# Patient Record
Sex: FEMALE | Race: BLACK OR AFRICAN AMERICAN
[De-identification: names, ages, dates, MRNs, and addresses within clinical notes are randomized per-mention and may not be internally consistent; named-entity substitution may affect disease eponyms.]

---

## 2020-07-24 ENCOUNTER — HOSPITAL ENCOUNTER (INPATIENT)
Dept: HOSPITAL 74 - JLDR | Age: 35
LOS: 2 days | Discharge: HOME | End: 2020-07-26
Attending: OBSTETRICS & GYNECOLOGY | Admitting: OBSTETRICS & GYNECOLOGY
Payer: COMMERCIAL

## 2020-07-24 VITALS — RESPIRATION RATE: 15 BRPM | OXYGEN SATURATION: 97 %

## 2020-07-24 VITALS — BODY MASS INDEX: 34.4 KG/M2

## 2020-07-24 DIAGNOSIS — Z91.013: ICD-10-CM

## 2020-07-24 DIAGNOSIS — Z3A.39: ICD-10-CM

## 2020-07-24 DIAGNOSIS — D25.1: ICD-10-CM

## 2020-07-24 DIAGNOSIS — O34.211: ICD-10-CM

## 2020-07-24 DIAGNOSIS — Z91.018: ICD-10-CM

## 2020-07-24 RX ADMIN — Medication SCH MLS: at 15:37

## 2020-07-24 NOTE — OP
Operative Note





- Note:


Operative Date: 07/24/20


Pre-Operative Diagnosis: repeat lt c  s


Operation: repeat lt c s


Post-Operative Diagnosis: Same as Pre-op


Surgeon: Cristofer Briseno


Assistant: Edson Smith


Anesthesiologist/CRNA: Gibson Garcia


Anesthesia: Spinal


Estimated Blood Loss (mls): 600 (no complications )


Operative Report Dictated: Yes

## 2020-07-24 NOTE — HP
- family desires PEG tube placement - GI consulted for placement    1. Continue Isosource 1.5 @ goal rate 50mL/hr.   - Provides 1800kcals, 82g protein and 917mL free water.      2. If more concentrated formula warranted (EF 45%), recommend Nutren 2.0 @ goal rate 40mL/hr.   - Provides 1920kcals, 81g protein and 664mL free water.   - Hold for residuals >500mL.      RD to monitor.       Past Medical History





- Admission


Chief Complaint: repeat lt c s , fibroid uterus


History Source: Patient


Limitations to Obtaining History: No Limitations





- Past Medical History


CNS: No: Alzheimer's, CVA, Dementia, Migraine, Multiple Sclerosis, Peripheral 

Neuropathy, Parkinson's, Seizure, Syncope, TIA, Vertigo, Other


Cardiovascular: No: AFIB, Aneurysm, Aortic Insufficiency, Aortic Stenosis, CAD, 

CHF, Deep Vein Thrombosis, HTN, Hyperlipdemia, MI, Mitral Insufficiency, Mitral 

Stenosis, Murmur, Pulmonary Hypertension, Other


Pulmonary: No: Asthma, Bronchitis, Cancer, COPD, O2 Dependent, Pneumonia, 

Previously Intubated, Pulmonary Embolus, Pulmonary Fibrosis, Sleep Apnea, Other


Gastrointestinal: No: Ascites, Cancer, Constipation, Crohn's Disease, Diverti

culitis, Diverticulosis, Esophageal Varices, Gastritis, GERD, GI Bleed, 

Hemorrhoids, Hiatal Hernia, Inflamatory Bowel Disease, Irritable Bowel Disease, 

Pancreatitis, Peptic Ulcer Disease, Ulcerative Colitis, Other


Hepatobiliary: No: Cirrhosis, Cholelithiasis, Cholecystitis, 

Choledocholithiasis, Hepatitis A, Hepatitis B, Hepatitis C, Other


Renal/: No: Renal Failure, Renal Inusuff, BPH, Cancer, Hematuria, 

Hemodialysis, Neurogenic Bladder, Renal Calculi, UTI, Other


Reproductive: No: Ectopic Pregnancy, Endometriosis, Fibroids, PID, Polycystic 

Ovary Syndrome, Postmenopausal, Other


...: 2


...Para: 1


...Term: 1


...: 0


...Spon : 0


...Induced : 0


...Living Children: 1


...Multiple Gestation: 0


...LMP: 10/23/19


... Weeks Gestation by Dates: 39.0


...EDC by Dates: 20


Heme/Onc: No: Anemia, B12 Deficiency, Bleeding Disorder, Cancer, Current 

Chemotherapy, Current Radiation Therapy, Hemochromatosis, Hypercoaguable State, 

Myeloproliferative Synd, Sickle Cell Disease, Sickle Cell Trait, Thro

mbocytopenia, Other


Infectious Disease: No: AIDS, C-Diff, Herpes Zoster, HIV, MRSA, STD's, 

Tuberculosis, VREF, Other


Psych: No: Addictions, Anxiety, Bipolar, Depression, Panic, Psychosis, 

Schizophrenia, Other


Musculoskeletal: No: Bursitis, Chronic low back pain, Hemiparesis, Hemiplegia, 

Osteoarthritis, Paraplegia, Other


Rheumatology: No: Fibromyalgia, Gout, Lupus, Rheumatoid Arthritis, Sarcoidosis, 

Vasculitis, Other


ENT: No: Allergic Rhinitis, Sinusitis, Other


Endocrine: No: Nicollet's Disease, Cushing's Disease, Diabetes Insipidus, 

Diabetes Mellitus, Hyperparathyroidism, Hyperthyroidism, Hypothyroidism, 

Osteopenia, SIADH, Other


Dermatology: No: Basal Cell, Cellulitis, Eczema, Melanoma, Psoriasis, Squamous 

Cell, Other





- Past Surgical History


Past Surgical History: Yes: 


Hx Myomectomy: No


Hx Transabdominal Cerclage: No





- Advance Directives


Advance Directives: Yes: Living Will





- Smoking History


Smoking history: Never smoked


Have you smoked in the past 12 months: No





- Alcohol/Substance Use


Hx Alcohol Use: No


History of Substance Use: reports: None





- Social History


Usual Living Arrangement: Yes: With Significant Other


Do you think of yourself as: Straight/Heterosexual


ADL: Independent


History of Recent Travel: No





Home Medications





- Allergies


Allergies/Adverse Reactions: 


                                    Allergies











Allergy/AdvReac Type Severity Reaction Status Date / Time


 


shellfish derived Allergy Severe Difficulty Verified 20 11:21





   Breathing  


 


hazelnut Allergy Intermediate Swelling Verified 20 11:21


 


No Known Drug Allergies Allergy   Verified 20 11:21














- Home Medications


Home Medications: 


Ambulatory Orders





Prenatal Vitamins (Sjr) - 1 tab PO DAILY 20 











Family Medical History


Family History: Denies





Review of Systems





- Review of Systems


Constitutional: reports: No Symptoms


Eyes: reports: No Symptoms


HENT: reports: No Symptoms


Neck: reports: No Symptoms


Cardiovascular: reports: No Symptoms


Respiratory: reports: No Symptoms


Gastrointestinal: reports: No Symptoms


Genitourinary: reports: No Symptoms


Breasts: reports: No Symptoms Reported


Musculoskeletal: reports: No Symptoms


Integumentary: reports: No Symptoms


Neurological: reports: No Symptoms


Endocrine: reports: No Symptoms


Hematology/Lymphatic: reports: No Symptoms


Psychiatric: reports: No Symptoms





Physical Exam - Maternity


Vital Signs: 


                                   Vital Signs











Temperature  98.4 F   20 11:20


 


Pulse Rate  90   20 11:20


 


Respiratory Rate  18   20 11:20


 


Blood Pressure  119/71   20 11:20


 


O2 Sat by Pulse Oximetry (%)      











Constitutional: Yes: Well Nourished, No Distress, Calm


Eyes: Yes: WNL, Conjunctiva Clear, EOM Intact


HENT: Yes: WNL, Atraumatic, Normocephalic


Neck: Yes: WNL, Supple, Trachea Midline


Cardiovascular: Yes: WNL, Regular Rate and Rhythm


Lungs: Clear to auscultation


Breast(s): Yes: WNL





- Abdominal Exam/OB


Number of Fetuses: Single


Fetal Presentation: Vertex


Contractions: Yes


Regularity: Irregular


Intensity: Mild


Fetal Monitor Mode: External


Fetal Heart Rate Location: [facility]


Category: I


Accelerations: Uniform


Decelerations: None





- Vaginal Exam/OB


Vaginal Bleeding: No


Speculum Exam: No


Dilatation (cm): 1


Effacement (%): 20


Amniotic Membrane Status: Intact


Meconium: Light


Fetal Presentation: Vertex/Position


Fetal Station: -2





- Physical Exam


Musculoskeletal: Yes: WNL


Extremities: Yes: WNL


Edema: Yes


Edema: LUE: 1+, RUE: 1+, LLE: 1+, RLE: 1+


Integumentary: Yes: WNL


...Motor Strength: WNL


Psychiatric: Yes: WNL, Alert, Oriented





Hemorrhage Risk Assessment





- Risk Factors


Medium Risk Factors: Yes: Prior , uterine surgery,or multiple 

laparotomies


Risk Score: 1


Risk Level: Medium Risk





Assessment/Plan





for repeat lt c s

## 2020-07-25 LAB
BASOPHILS # BLD: 0.2 % (ref 0–2)
DEPRECATED RDW RBC AUTO: 14 % (ref 11.6–15.6)
EOSINOPHIL # BLD: 0.1 % (ref 0–4.5)
HCT VFR BLD CALC: 33.8 % (ref 32.4–45.2)
HGB BLD-MCNC: 11.2 GM/DL (ref 10.7–15.3)
LYMPHOCYTES # BLD: 11.3 % (ref 8–40)
MCH RBC QN AUTO: 31.4 PG (ref 25.7–33.7)
MCHC RBC AUTO-ENTMCNC: 33.1 G/DL (ref 32–36)
MCV RBC: 94.9 FL (ref 80–96)
MONOCYTES # BLD AUTO: 8.3 % (ref 3.8–10.2)
NEUTROPHILS # BLD: 80.1 % (ref 42.8–82.8)
PLATELET # BLD AUTO: 179 K/MM3 (ref 134–434)
PMV BLD: 9.2 FL (ref 7.5–11.1)
RBC # BLD AUTO: 3.57 M/MM3 (ref 3.6–5.2)
WBC # BLD AUTO: 13 K/MM3 (ref 4–10)

## 2020-07-25 RX ADMIN — ENOXAPARIN SODIUM SCH MG: 40 INJECTION SUBCUTANEOUS at 09:58

## 2020-07-25 RX ADMIN — ACETAMINOPHEN PRN MG: 325 TABLET ORAL at 08:24

## 2020-07-25 RX ADMIN — SIMETHICONE CHEW TAB 80 MG PRN MG: 80 TABLET ORAL at 13:59

## 2020-07-25 RX ADMIN — ACETAMINOPHEN PRN MG: 325 TABLET ORAL at 20:23

## 2020-07-25 RX ADMIN — SIMETHICONE CHEW TAB 80 MG PRN MG: 80 TABLET ORAL at 08:25

## 2020-07-25 RX ADMIN — IBUPROFEN PRN MG: 600 TABLET, FILM COATED ORAL at 20:23

## 2020-07-25 RX ADMIN — Medication SCH TAB: at 09:58

## 2020-07-25 RX ADMIN — Medication SCH: at 20:53

## 2020-07-25 RX ADMIN — SODIUM CHLORIDE, SODIUM GLUCONATE, SODIUM ACETATE, POTASSIUM CHLORIDE, AND MAGNESIUM CHLORIDE SCH: 526; 502; 368; 37; 30 INJECTION, SOLUTION INTRAVENOUS at 20:53

## 2020-07-25 RX ADMIN — SIMETHICONE CHEW TAB 80 MG PRN MG: 80 TABLET ORAL at 20:23

## 2020-07-25 RX ADMIN — IBUPROFEN PRN MG: 600 TABLET, FILM COATED ORAL at 13:58

## 2020-07-25 RX ADMIN — ACETAMINOPHEN PRN MG: 325 TABLET ORAL at 13:58

## 2020-07-25 NOTE — PN
Post Partum Progress Note


Post Partum Day: 1


Type of Delivery: Repeat C/S


Vital Signs: 


                                   Vital Signs











Temperature  98.6 F   07/25/20 14:00


 


Pulse Rate  100 H  07/25/20 14:00


 


Respiratory Rate  20   07/25/20 17:00


 


Blood Pressure  100/63   07/25/20 14:00


 


O2 Sat by Pulse Oximetry (%)  94 L  07/24/20 22:00











Breast Exam: Yes: Soft


Uterus: Yes: Fundus Firm, Fundus below umbilicus


Incision: Yes: Dressing dry and intact, Sutures intact


Abdomen/GI: Yes: Abdomen soft, Passing flatus, Tolerating PO


Lochia: Yes: Serosa


Lochia, amount: Small


Extremities: Yes: Calves non-tender


Activity: Ambulating (doing well, oob as much as possible )





- Labs


Labs: 


                                       CBC











WBC  13.0 K/mm3 (4.0-10.0)  H  07/25/20  07:07    


 


RBC  3.57 M/mm3 (3.60-5.2)  L  07/25/20  07:07    


 


Hgb  11.2 GM/dL (10.7-15.3)   07/25/20  07:07    


 


Hct  33.8 % (32.4-45.2)   07/25/20  07:07    


 


MCV  94.9 fl (80-96)   07/25/20  07:07    


 


MCH  31.4 pg (25.7-33.7)   07/25/20  07:07    


 


MCHC  33.1 g/dl (32.0-36.0)   07/25/20  07:07    


 


RDW  14.0 % (11.6-15.6)   07/25/20  07:07    


 


Plt Count  179 K/MM3 (134-434)   07/25/20  07:07    


 


MPV  9.2 fl (7.5-11.1)   07/25/20  07:07    


 


Absolute Neuts (auto)  10.4 K/mm3 (1.5-8.0)  H  07/25/20  07:07    


 


Neutrophils %  80.1 % (42.8-82.8)   07/25/20  07:07    


 


Lymphocytes %  11.3 % (8-40)   07/25/20  07:07    


 


Monocytes %  8.3 % (3.8-10.2)   07/25/20  07:07    


 


Eosinophils %  0.1 % (0-4.5)   07/25/20  07:07    


 


Basophils %  0.2 % (0-2.0)   07/25/20  07:07    


 


Nucleated RBC %  0 % (0-0)   07/25/20  07:07

## 2020-07-25 NOTE — PN
Progress Note (short form)





- Note


Progress Note: 





34 F s/p C/S. pt doing well w/o complaints. pain control. good result of 

anesthetic care

## 2020-07-25 NOTE — OP
DATE OF OPERATION:  2020

 

PREOPERATIVE DIAGNOSIS:  Repeat low transverse  section.

 

POSTOPERATIVE DIAGNOSIS:  Repeat low transverse  section.

 

PROCEDURE:  Repeat low transverse  section.

 

SURGEON:  Cristofer Fernández MD

 

ASSISTANT:  ILANA Lopes

 

ANESTHESIOLOGIST:  Gibson Garcia MD

 

ANESTHESIA:  Spinal anesthesia.

 

INDICATION:  This is a 34-year-old female patient, 39 weeks pregnant, previous low

transverse  section and fibroid uterus and the same diagnosis, and patient is

taken to the OR for repeat low transverse  section at 39 weeks pregnant.

 

DESCRIPTION OF PROCEDURE:  Patient was placed on the operating table in supine

position.  After the spinal anesthesia was obtained, the patient's abdomen and pelvis

were prepped and draped in the usual sterile manner.  Pfannenstiel incision was made.

 Incision was made through the skin and subcutaneous tissue, and the fascia was

nicked in the midline.  Then, old keloid was removed at the same time on the skin. 

So, the fascia extended bilaterally.  Intraperitoneal cavity was entered.  Bladder

flap was not created.  Low transverse segment was entered, and the baby was delivered

from LOT position.  Baby was handed over to the pediatrician after umbilical cord

doubly clamped and cut.  Placenta was removed.  There was an intramural fibroid at

the _____, a little higher than the lower segment area, about 4 x 5 cm of intramural

fibroid, but we were able to bypass that, and we were able to close the lower segment

of the uterus without touching the fibroid.  Other than that, both ovaries, fallopian

tubes were within normal limits.  Good hemostasis.  Both gutters cleaned.  Both the

ovaries and fallopian tubes were within normal limits except for the fibroid uterus. 

Peritoneum was closed.  Fascia was closed.  Skin was closed with subcuticular suture.

 Draining clear urine.  Blood loss about 600 mL.  Transferred to recovery room in

stable condition.

 

 

CRISTOFER FERNÁNDEZ MD

 

[id number]

DD: 2020 21:16

DT: 2020 22:04

Job #:  [id number]

## 2020-07-26 VITALS — HEART RATE: 88 BPM | SYSTOLIC BLOOD PRESSURE: 110 MMHG | DIASTOLIC BLOOD PRESSURE: 68 MMHG | TEMPERATURE: 98.6 F

## 2020-07-26 RX ADMIN — ENOXAPARIN SODIUM SCH MG: 40 INJECTION SUBCUTANEOUS at 09:47

## 2020-07-26 RX ADMIN — Medication SCH TAB: at 09:47

## 2020-07-26 RX ADMIN — ACETAMINOPHEN PRN MG: 325 TABLET ORAL at 04:21

## 2020-07-26 RX ADMIN — IBUPROFEN PRN MG: 600 TABLET, FILM COATED ORAL at 08:24

## 2020-07-26 RX ADMIN — SIMETHICONE CHEW TAB 80 MG PRN MG: 80 TABLET ORAL at 08:25

## 2020-07-26 RX ADMIN — SIMETHICONE CHEW TAB 80 MG PRN MG: 80 TABLET ORAL at 04:22

## 2020-07-26 RX ADMIN — IBUPROFEN PRN MG: 600 TABLET, FILM COATED ORAL at 04:21

## 2020-07-26 NOTE — DS
Physical Exam-GYN


Vital Signs: 


                                   Vital Signs











Temperature  98.6 F   20 10:00


 


Pulse Rate  88   20 10:00


 


Respiratory Rate  20 10:00


 


Blood Pressure  110/68   20 10:00


 


O2 Sat by Pulse Oximetry (%)  94 L  20 22:00











Constitutional: Yes: Well Nourished, No Distress, Calm


Eyes: Yes: WNL, Conjunctiva Clear, EOM Intact


HENT: Yes: WNL, Atraumatic, Normocephalic


Neck: Yes: WNL, Supple, Trachea Midline


Cardiovascular: Yes: WNL, Regular Rate and Rhythm


Respiratory: Yes: WNL, Regular, CTA Bilaterally


Gastrointestinal: Yes: WNL, Normal Bowel Sounds, Soft


...Rectal Exam: Yes: WNL


Renal/: Yes: WNL


Pelvis: Yes: WNL


External Genitalia: Yes: Normal


Vaginal Exam: Yes: Normal


Cervix: Yes: Normal


Uterus: Yes: Normal


Adnexa: Normal: Bilateral


....Post Partum: Yes: Uterus firm, Uterus non-tender


Breast(s): Yes: WNL


Musculoskeletal: Yes: WNL


Extremities: Yes: WNL


Edema: Yes


Edema: LUE: 1+, RUE: 1+, LLE: 1+, RLE: 1+


Integumentary: Yes: WNL


Wound/Incision: Yes: Clean/Dry, Well Approximated


Neurological: Yes: WNL, Alert, Oriented


...Motor Strength: WNL


Psychiatric: Yes: WNL, Alert, Oriented


Labs: 


                                    CBC, BMP





                                 20 07:07 











Delivery





- Delivery


Type of Anesthesia: Spinal


Episiotomy/Laceration: None





Delivery, Single Birth





- Stages of Labor


Date of Delivery: 20


Time of Delivery: 13:46


Time Placenta Delivered: 13:47





- Condition of Infant


Pediatrician/Neonatologist Present: Yes


Name: Marilu Bangura


Infant Gender: Female


Birth Weight: 3.345 kg


Total Hours ROM (Hrs/Mins): 0hrs 2min





- Apgar


  ** 1 Minute


Apgar Total Score: 9





  ** 5 Minutes


Apgar Total Score: 9





- [place] Feeding Plan


Initial Plan: Exclusive breastfeeding throughout hospitalization





Discharge Summary


Problems reviewed: Yes


Reason For Visit: 


Procedures: Principal: repeat lt c s


Other Procedures: none


Hospital Course: 


uneventful 


Health Concerns: 


none 


Plan of Treatment: 


oob 


Condition: Good





- Instructions


Diet, Activity, Other Instructions: 





Physical activity





Resume your normal everyday activity as tolerated no heavy lifting or exercise 

until seen by your surgeon. You may walk unlimited patricia of and climb stairs. 

You may resume driving the car when you feel safe and comfortable behind the 

wheel. No sexual activity as instructed.





Wound care


If you have a bandage, leave it on, and keep dry for 48-72 hours. After that 

time discard the outer bandage. If they are tapes on the skin under the out of 

bandage leave them in place. They will peel off in the next 7 to 10 days. Do Not

Peel them off. You may shower the day after surgery. If there are tapes present 

on the skin, you may shower over them.





Diet


There are no dietary restrictions. Eat healthy, high-fiber foods. Drink 6 to 8 

glasses of liquid each day. This will assist in keeping your bowels are regular.





Pain management


You may take Tylenol or acetaminophen or Ibuprofen (for example, Motrin, Advil 

etc.) from my pain prescription medication is ordered should be taken as 

prescribed for moderate to severe pain.





Call MD for any of the following: call dr degroot for 2 weeks appointment 





Severe pain not relieved by medication


Fever of 101 or higher


Excessive bleeding or drainage on dressing


Inability to urinate

















Disposition: HOME





- Home Medications


Comprehensive Discharge Medication List: 


Ambulatory Orders





Prenatal Vitamins (Sjr) - 1 tab PO DAILY 20 








Prescription Drug Monitoring Program (I-STOP) results: I-STOP reviewed and no 

issues identified

## 2020-07-26 NOTE — PN
Post Partum Progress Note


Post Partum Day: 2


Type of Delivery: Repeat C/S


Vital Signs: 


                                   Vital Signs











Temperature  98.6 F   07/26/20 10:00


 


Pulse Rate  88   07/26/20 10:00


 


Respiratory Rate  20   07/26/20 10:00


 


Blood Pressure  110/68   07/26/20 10:00


 


O2 Sat by Pulse Oximetry (%)  94 L  07/24/20 22:00











Breast Exam: Yes: Soft


Uterus: Yes: Fundus Firm, Fundus below umbilicus, Non-tender


Incision: Yes: Dressing dry and intact, Sutures intact


Abdomen/GI: Yes: Abdomen soft, Passing flatus, Tolerating PO


Lochia: Yes: Serosa


Lochia, amount: Small


Extremities: Yes: Calves non-tender


Perineum: Yes: Intact


Activity: Ambulating (doing well, dc pt home tomorrow due baby is slightly 

jaundice )





- Labs


Labs: 


                                       CBC











WBC  13.0 K/mm3 (4.0-10.0)  H  07/25/20  07:07    


 


RBC  3.57 M/mm3 (3.60-5.2)  L  07/25/20  07:07    


 


Hgb  11.2 GM/dL (10.7-15.3)   07/25/20  07:07    


 


Hct  33.8 % (32.4-45.2)   07/25/20  07:07    


 


MCV  94.9 fl (80-96)   07/25/20  07:07    


 


MCH  31.4 pg (25.7-33.7)   07/25/20  07:07    


 


MCHC  33.1 g/dl (32.0-36.0)   07/25/20  07:07    


 


RDW  14.0 % (11.6-15.6)   07/25/20  07:07    


 


Plt Count  179 K/MM3 (134-434)   07/25/20  07:07    


 


MPV  9.2 fl (7.5-11.1)   07/25/20  07:07    


 


Absolute Neuts (auto)  10.4 K/mm3 (1.5-8.0)  H  07/25/20  07:07    


 


Neutrophils %  80.1 % (42.8-82.8)   07/25/20  07:07    


 


Lymphocytes %  11.3 % (8-40)   07/25/20  07:07    


 


Monocytes %  8.3 % (3.8-10.2)   07/25/20  07:07    


 


Eosinophils %  0.1 % (0-4.5)   07/25/20  07:07    


 


Basophils %  0.2 % (0-2.0)   07/25/20  07:07    


 


Nucleated RBC %  0 % (0-0)   07/25/20  07:07

## 2020-07-28 NOTE — PATH
Surgical Pathology Report



Patient Name:  MARIELY MOISE

Accession #:  [id number]

Med. Rec. #:  [id number]                                                        

   /Age/Gender:  1985 (Age: 34) / F

Account:  [id number]                                                          

             Location: [facility] OBS/GYN

Taken:  2020

Received:  2020

Reported:  2020

Physicians:  Cristofer Briseno MD

  



Specimen(s) Received

 PLACENTA 





Clinical History









Final Diagnosis

PLACENTA:

THIRD TRIMESTER PLACENTA WITH PERIVILLOUS FIBRIN DEPOSITION.

TRIVASCULAR CORD.

MEMBRANES WITH NO DIAGNOSTIC ABNORMALITIES.





***Electronically Signed***

Irene Méndez M.D.





Gross Description

The specimen is received fresh labeled placenta and is a 443 gram, 16.5 x 16.0 x

2.4 cm. placenta with attached membranes and umbilical cord.  The attached

membranes are tan, translucent with focal opacities and insert marginally.  The

umbilical cord measures 48 cm. in length and averages 1 cm. in diameter.  The

cord inserts eccentrically, 2.5 cm. to the nearest margin.  No true knots or

strictures are identified. Cut surface of the umbilical cord reveals 3 vessels.

The fetal surface is grey-blue with minimal fibrin deposition and appropriate

caliber vessels.  The maternal surface is red-brown with focal defects. 

Sectioning reveals red-brown, spongy parenchyma.  No lesions are identified. 

Representative sections are submitted in three cassettes as follows: 1- membrane

rolls and umbilical cord; 2-3- full thickness sections of placenta.

/2020



saudi/[email or web address]